# Patient Record
Sex: MALE | URBAN - METROPOLITAN AREA
[De-identification: names, ages, dates, MRNs, and addresses within clinical notes are randomized per-mention and may not be internally consistent; named-entity substitution may affect disease eponyms.]

---

## 2019-04-15 DIAGNOSIS — Z96.641 PRESENCE OF RIGHT ARTIFICIAL HIP JOINT: Primary | ICD-10-CM

## 2019-04-22 ENCOUNTER — CLINICAL SUPPORT (OUTPATIENT)
Dept: REHABILITATION | Facility: HOSPITAL | Age: 64
End: 2019-04-22
Payer: MEDICARE

## 2019-04-22 DIAGNOSIS — Z74.09 IMPAIRED FUNCTIONAL MOBILITY, BALANCE, GAIT, AND ENDURANCE: ICD-10-CM

## 2019-04-22 DIAGNOSIS — R29.898 WEAKNESS OF RIGHT HIP: ICD-10-CM

## 2019-04-22 DIAGNOSIS — M25.651 DECREASED RANGE OF RIGHT HIP MOVEMENT: ICD-10-CM

## 2019-04-22 PROCEDURE — 97161 PT EVAL LOW COMPLEX 20 MIN: CPT | Mod: PO | Performed by: PHYSICAL THERAPIST

## 2019-04-22 PROCEDURE — G8979 MOBILITY GOAL STATUS: HCPCS | Mod: CK,PO | Performed by: PHYSICAL THERAPIST

## 2019-04-22 PROCEDURE — G8978 MOBILITY CURRENT STATUS: HCPCS | Mod: CK,PO | Performed by: PHYSICAL THERAPIST

## 2019-04-22 NOTE — PROGRESS NOTES
[facility]  Physical Therapy Initial Evaluation    Name: Ren Edge  Clinic Number: [id number]    Therapy Diagnosis:   Encounter Diagnoses   Name Primary?    Weakness of right hip     Impaired functional mobility, balance, gait, and endurance     Decreased range of right hip movement      Physician: Keith Parra, MARCIAL    Physician Orders: PT Eval and Treat left hip  Medical Diagnosis from Referral: osteoarthritis right hip; status post hip replacement   Evaluation Date: 4/22/2019  Authorization Period Expiration: 4/14/2020  Plan of Care Expiration: 7/22/2019  Visit # / Visits authorized: 1/ 1    Time In: 1520  Time Out: 1620  Total Billable Time: 60 minutes    Precautions: blood thinners and diuretics    Subjective      Medical History:   No past medical history on file.    Surgical History:   Ren Edge  has no past surgical history on file.    Medications:   Ren currently has no medications in their medication list.    Allergies:   Review of patient's allergies indicates:  Allergies not on file        History of current condition - Ren reports: the hip is feeling fine. He is not reporting any pain but feels there is weakness in the right leg. He feels his balance is a problem .  Date of onset: surgery - right THR 3/18/2019. Did not undergo  Physical Therapy. He started to drive 4 days after the surgery. Developed a fluid retention problem which created blistering in the legs with oozing. Says he experienced challenges with trying to schedule therapy. Presently the blistering has improved with very minimal fluid oozing.   He also is  Relating to challenges with both knees and is being told he is in need of TKR.     Pain:  Current 0/10, worst 0/10, best 0/10   Location: right hip   Description: NA  Aggravating Factors: nothing at this time   Easing Factors: reclined on the sofa   Current Level of Function:   Personal - no limitation bathing / dressing    Domestic - climbing, but he says he is not significantly restricted.   Community / social - depends on the terrain and for how long he is walking; grass cutting.   Occupation - NA   Recreation / fitness - Does not participate, limited by previous hip problems   Prior Therapy: no   Social History:   lives with their spouse in a single family   Occupation: currently not working.   Prior Level of Function: less functional prior to surgery.   Pts goals: get back to work where he can tolerate walking, climbing etc.   Imaging, none:             Objective     Posture: slight shift to the left  Palpation: unremarkable for elicited pain in the right hip, gluteal area   Sensation: intact     Range of Motion/Strength:       Hip  Right  Pain/Dysfunction with Movement    AROM PROM MMT    Flexion 90 110 3-/5    Extension 10 20 2+/5    Abduction 30 sup 40 sup 2-/5    Internal rotation 20 sit  25 pro 25 sit  35 pro NA  NA    External rotation 20 sit  45 pro   40 sit  50 pro NA  NA           Gait: With AD.  Device Used -  Cane  Analysis: demonstrates a flexed right knee position at mid stance and during heel strike   Bed Mobility:Independent  Transfers: Independent      CMS Impairment/Limitation/Restriction for FOTO hip Survey    Therapist reviewed FOTO scores for Ren Edge on 4/22/2019.   FOTO documents entered into [employer] - see Media section.    Limitation Score: 54%  Category: Mobility    Current : CK = at least 40% but < 60% impaired, limited or restricted  Goal: CK = at least 40% but < 60% impaired, limited or restricted  Discharge: CJ = at least 20% but < 40% impaired, limited or restricted           Assessment     Ren is a 63 y.o. male referred to outpatient Physical Therapy with a medical diagnosis of osteoarthritis right hip; status post hip replacement . Pt presents with clinical findings of decreased active hip mobility due to weakness. Hip joint motion, passively, is satisfactory and not restricted.   There is a Knee extension  joint mobility and / or tissue extensibility dysfunction. Standing balance is challenged due to limitations at the knee joint and RLE weakness.      Pt prognosis is Good.   Pt will benefit from skilled outpatient Physical Therapy to address the deficits stated above and in the chart below, provide pt/family education, and to maximize pt's level of independence.     Plan of care discussed with patient: Yes  Pt's spiritual, cultural and educational needs considered and patient is agreeable to the plan of care and goals as stated below:     Anticipated Barriers for therapy: none    Medical Necessity is demonstrated by the following  History  Co-morbidities and personal factors that may impact the plan of care Co-morbidities:   fluid retention, diuretics and blood thinners.    Personal Factors:   no deficits     low   Examination  Body Structures and Functions, activity limitations and participation restrictions that may impact the plan of care Body Regions:   lower extremities  right hip    Body Systems:    musculoskeletal    Participation Restrictions:   None     Activity limitations:   Learning and applying knowledge  no deficits    General Tasks and Commands  no deficits    Communication  no deficits    Mobility  lifting and carrying objects  walking  driving (bike, car, motorcycle)    Self care  no deficits    Domestic Life  doing house work (cleaning house, washing dishes, laundry)  climbing     Interactions/Relationships  no deficits    Life Areas  no deficits    Community and Social Life  community life  due to walking distance and terrain concerns         low   Clinical Presentation stable and uncomplicated low   Decision Making/ Complexity Score: low       Short Term Goals (6 Weeks):      1.Pt to increase strength by a 1/2 grade of muscles test to allow for improvement in functional activities such as performing chores.  2.Pt to improve range of motion by 25% to allow for improved  functional mobility to allow for improvement in IA DLS.   3.Pt to report compliance with HEP and demonstrate proper exercise technique to PT to show competence with self management of condition.  4.Decrease pain by 25% during functional activities.     Long Term Goals (12 Weeks):      1. Increase ROM to allow improved joint biomechanics during functional activities.   2.Increase trunk and lower extremity strength to within normal limits during functional activities.   3. Independent with home exercise program.   4. Full return to functional activities with manageable complaints.  5. Patient to demonstrate improved posture and body mechanics.  6. Decrease pain by 75% during functional activities.          Plan     Plan of care Certification: 4/22/2019 to 7/22/2019.    Outpatient Physical Therapy 2 times weekly for 10 weeks to include the following interventions: Manual Therapy, Neuromuscular Re-ed, Patient Education and Therapeutic Exercise.     Pierre Lala, PT

## 2019-04-30 ENCOUNTER — CLINICAL SUPPORT (OUTPATIENT)
Dept: REHABILITATION | Facility: HOSPITAL | Age: 64
End: 2019-04-30
Payer: MEDICARE

## 2019-04-30 DIAGNOSIS — M25.651 DECREASED RANGE OF RIGHT HIP MOVEMENT: ICD-10-CM

## 2019-04-30 DIAGNOSIS — Z74.09 IMPAIRED FUNCTIONAL MOBILITY, BALANCE, GAIT, AND ENDURANCE: ICD-10-CM

## 2019-04-30 DIAGNOSIS — R29.898 WEAKNESS OF RIGHT HIP: ICD-10-CM

## 2019-04-30 PROCEDURE — 97110 THERAPEUTIC EXERCISES: CPT | Mod: PO

## 2019-04-30 NOTE — PROGRESS NOTES
"  Physical Therapy Daily Treatment Note     Name: Ren Edge  Clinic Number: [id number]    Therapy Diagnosis:   Encounter Diagnoses   Name Primary?    Weakness of right hip     Impaired functional mobility, balance, gait, and endurance     Decreased range of right hip movement      Physician: Keith Parra PA-C    Visit Date: 4/30/2019  Physician Orders: PT Eval and Treat left hip  Medical Diagnosis from Referral: osteoarthritis right hip; status post hip replacement   Evaluation Date: 4/22/2019  Authorization Period Expiration: 4/14/2020  Plan of Care Expiration: 7/22/2019  Visit # / Visits authorized: 2/ 12    Time In: 3:05PM  Time Out: 3:55PM  Total Billable Time: 50 minutes    Precautions: blood thinners and diuretics    Subjective     Pt reports: that his hip is not doing too bad bit c/o some chronic (R) knee pain and stiffness stating that he was told that he needs a knee replacement. He also c/o chronic back pain which he feels is better since his hip surgery.   He was compliant with home exercise program.  Response to previous treatment: Min residual soreness  Functional change: None     Pain: 3/10  Location: right hip and knee      Objective   Patient to clinic ambulating Modified independently with a SC on (L)    Ren received therapeutic exercises to develop strength, endurance, ROM and flexibility for 50 minutes including:     Upright stationary bike x 5 minutes level 1, seat ht = 12     Supine ex's  Heel slides 2 x 10  Hooklying clam with GTB 2 x 10  Hooklying hip add isometric x 20  Bridging 2 x 10    Seated ex's   Repeated sit<->stand from mat @22" height without UE assist 2 x 10 trials     Standing ex's   heel raises x 20  Marching in place x 15 each  HIp abduction x 15 each  Hip extension ( limited ROM) x 15 each     Home Exercises Provided and Patient Education Provided     Education provided:   - cues with ex's   -updated HEP    Written Home Exercises Provided: yes.  Exercises were " reviewed and Ren was able to demonstrate them prior to the end of the session.  Ren demonstrated good  understanding of the education provided.     See EMR under Patient Instructions for exercises provided 4/30/2019.    Assessment     Patient tolerated treatment fairly well. He was able to perform the above ex's/activities within tolerable level of muscular fatigue and discomfort. His arthritic knees are more bothersome than his post-op hip currently. Some generalized weakness and deconditioning is evident requiring intermittent rest breaks due to fatigue. .   Ren is progressing fairly well towards his goals.   Pt prognosis is Good.     Pt will continue to benefit from skilled outpatient physical therapy to address the deficits listed in the problem list box on initial evaluation, provide pt/family education and to maximize pt's level of independence in the home and community environment.     Pt's spiritual, cultural and educational needs considered and pt agreeable to plan of care and goals.     Anticipated barriers to physical therapy: none    Goals: from eval  Short Term Goals (6 Weeks):      1.Pt to increase strength by a 1/2 grade of muscles test to allow for improvement in functional activities such as performing chores. ( progressing)   2.Pt to improve range of motion by 25% to allow for improved functional mobility to allow for improvement in IA DLS. (progressing)  3.Pt to report compliance with HEP and demonstrate proper exercise technique to PT to show competence with self management of condition.(progressing)   4.Decrease pain by 25% during functional activities.(progressing)     Long Term Goals (12 Weeks):      1. Increase ROM to allow improved joint biomechanics during functional activities.   2.Increase trunk and lower extremity strength to within normal limits during functional activities.   3. Independent with home exercise program.   4. Full return to functional activities with manageable  complaints.  5. Patient to demonstrate improved posture and body mechanics.  6. Decrease pain by 75% during functional activities.       Plan     Continue PT towards established plan of care and goals with focus on strength, flexibility, endurance    Jessu Laguerre, PTA

## 2019-05-07 ENCOUNTER — CLINICAL SUPPORT (OUTPATIENT)
Dept: REHABILITATION | Facility: HOSPITAL | Age: 64
End: 2019-05-07
Payer: MEDICARE

## 2019-05-07 DIAGNOSIS — Z74.09 IMPAIRED FUNCTIONAL MOBILITY, BALANCE, GAIT, AND ENDURANCE: ICD-10-CM

## 2019-05-07 DIAGNOSIS — M25.651 DECREASED RANGE OF RIGHT HIP MOVEMENT: Primary | ICD-10-CM

## 2019-05-07 DIAGNOSIS — R29.898 WEAKNESS OF RIGHT HIP: ICD-10-CM

## 2019-05-07 PROCEDURE — 97110 THERAPEUTIC EXERCISES: CPT | Mod: PO | Performed by: PHYSICAL THERAPIST

## 2019-05-07 NOTE — PROGRESS NOTES
"  Physical Therapy Daily Treatment Note     Name: Ren Edge  Clinic Number: [id number]    Therapy Diagnosis:   Encounter Diagnoses   Name Primary?    Decreased range of right hip movement Yes    Impaired functional mobility, balance, gait, and endurance     Weakness of right hip      Physician: Keith Parra PA-C    Visit Date: 5/7/2019  Physician Orders: PT Eval and Treat left hip  Medical Diagnosis from Referral: osteoarthritis right hip; status post hip replacement   Evaluation Date: 4/22/2019  Authorization Period Expiration: 4/14/2020  Plan of Care Expiration: 7/22/2019  Visit # / Visits authorized: 2/ 12    Time In: 3:05 PM  Time Out: 4:10 PM  Total Billable Time: 65 minutes    Precautions: blood thinners and diuretics    Subjective     Pt reports: the knees have been hurting all week. The hip is all right. Certain movements seem to hurt. There is no pain in the hip today.      He was compliant with home exercise program.  Response to previous treatment: Min residual soreness  Functional change: None     Pain: 0/10 hip and 6/10 left knee   Location: right hip and knee      Objective   Patient to clinic ambulating Modified independently with a SC on (L)    Ren received therapeutic exercises to develop strength, endurance, ROM and flexibility for ** minutes including:     Upright stationary bike x 5 minutes level 1, seat ht = 12     Supine ex's  Assisted knee to chest w/CLX x20   Posterior hip stretch SKC 5" x 15   SLR  Assisted w/CLX x 15   Leonidas hip IR w/ball squeeze / hip ER against theraband hold 7" x 15   Heel slides 2 x 10    SIDELYING  Clam  2 x 10  Reverse clam  x 20   2 x 10    Seated ex's   Repeated sit<->stand from mat @22" height without UE assist 2 x 10 trials     Standing ex's  Heel raises x 20  Marching in place x 15 each  HIp abduction x 15 each  Hip extension ( limited ROM) x 15 each     Home Exercises Provided and Patient Education Provided     Education provided:   - cues with " ex's   -updated HEP    Written Home Exercises Provided: yes.  Exercises were reviewed and Ren was able to demonstrate them prior to the end of the session.  Ren demonstrated good  understanding of the education provided.     See EMR under Patient Instructions for exercises provided 4/30/2019.    Assessment     The patient demonstrates mobility dysfunction at the right hip. He is able to complete the activities but is challenged due to muscle tightness, weakness and decreased endurance. Balance and stability are noted compromised as relates to the RLE .   Ren is progressing fairly well towards his goals.   Pt prognosis is Good.   Pt will continue to benefit from skilled outpatient physical therapy to address the deficits listed in the problem list box on initial evaluation, provide pt/family education and to maximize pt's level of independence in the home and community environment.   Pt's spiritual, cultural and educational needs considered and pt agreeable to plan of care and goals.     Anticipated barriers to physical therapy: none    Goals: from eval  Short Term Goals (6 Weeks):      1.Pt to increase strength by a 1/2 grade of muscles test to allow for improvement in functional activities such as performing chores. ( progressing)   2.Pt to improve range of motion by 25% to allow for improved functional mobility to allow for improvement in IA DLS. (progressing)  3.Pt to report compliance with HEP and demonstrate proper exercise technique to PT to show competence with self management of condition.(progressing)   4.Decrease pain by 25% during functional activities.(progressing)     Long Term Goals (12 Weeks):      1. Increase ROM to allow improved joint biomechanics during functional activities.   2.Increase trunk and lower extremity strength to within normal limits during functional activities.   3. Independent with home exercise program.   4. Full return to functional activities with manageable  complaints.  5. Patient to demonstrate improved posture and body mechanics.  6. Decrease pain by 75% during functional activities.       Plan     Continue PT towards established plan of care and goals with focus on strength, flexibility, endurance    Pierre Lala, PT

## 2019-05-13 ENCOUNTER — CLINICAL SUPPORT (OUTPATIENT)
Dept: REHABILITATION | Facility: HOSPITAL | Age: 64
End: 2019-05-13
Payer: MEDICARE

## 2019-05-13 DIAGNOSIS — R29.898 WEAKNESS OF RIGHT HIP: ICD-10-CM

## 2019-05-13 DIAGNOSIS — Z74.09 IMPAIRED FUNCTIONAL MOBILITY, BALANCE, GAIT, AND ENDURANCE: ICD-10-CM

## 2019-05-13 DIAGNOSIS — M25.651 DECREASED RANGE OF RIGHT HIP MOVEMENT: Primary | ICD-10-CM

## 2019-05-13 PROCEDURE — 97110 THERAPEUTIC EXERCISES: CPT | Mod: PO | Performed by: PHYSICAL THERAPIST

## 2019-05-13 NOTE — PROGRESS NOTES
"  Physical Therapy Daily Treatment Note     Name: Ren Edge  Clinic Number: [id number]    Therapy Diagnosis:   Encounter Diagnoses   Name Primary?    Decreased range of right hip movement Yes    Impaired functional mobility, balance, gait, and endurance     Weakness of right hip      Physician: Keith Parra PA-C    Visit Date: 5/13/2019  Physician Orders: PT Eval and Treat left hip  Medical Diagnosis from Referral: osteoarthritis right hip; status post hip replacement   Evaluation Date: 4/22/2019  Authorization Period Expiration: 4/14/2020  Plan of Care Expiration: 7/22/2019  Visit # / Visits authorized: 2/ 12    Time In:1115   Time Out:   Total Billable Time:  minutes    Precautions: blood thinners and diuretics    Subjective     Pt reports: there is no hip pain but the left knee is painful today.    He was compliant with home exercise program.  Response to previous treatment: Min residual soreness  Functional change: None     Pain: 0/10 hip and 3/10 left knee   Location: right hip and left knee      Objective   Patient to clinic ambulating Modified independently with a SC on (L)    Ren received therapeutic exercises to develop strength, endurance, ROM and flexibility for ** minutes including:     Upright stationary bike x 5 minutes level 1, seat ht = 12     Supine ex's  Assisted knee to chest w/CLX x20   Posterior hip stretch SKC 5" x 15   SLR  Assisted w/CLX x 15 -  5" hold   SLR 2x10   Leonidas hip IR w/ball squeeze / hip ER against theraband hold 7" x 15   Heel slides 2 x 10   w/CLX    SIDELYING  Clam  2 x 10  Reverse clam  x 20 1#      Seated ex's   Repeated sit<->stand from mat @22" height without UE assist 2 x 10 trials     Standing ex's  Heel raises x 20  Single leg hi-knee  x 20 each  HIp abduction x 20 each  Hip extension ( limited ROM) x 20 each  Squats 2x10      Home Exercises Provided and Patient Education Provided     Education provided:   -cues with ex's   -updated HEP    Written Home " Exercises Provided: yes.  Exercises were reviewed and Ren was able to demonstrate them prior to the end of the session.  Ren demonstrated good  understanding of the education provided.     See EMR under Patient Instructions for exercises provided 4/30/2019.    Assessment     Activities performed and completed as noted. Endurance is compromised. He has shown some improvement in mobility. Will have to progress with CC activity, balance and stability challenges. Pain is not demonstrated by limitation when performing the activities.   Ren is progressing fairly well towards his goals.   Pt prognosis is Good.   Pt will continue to benefit from skilled outpatient physical therapy to address the deficits listed in the problem list box on initial evaluation, provide pt/family education and to maximize pt's level of independence in the home and community environment.   Pt's spiritual, cultural and educational needs considered and pt agreeable to plan of care and goals.     Anticipated barriers to physical therapy: none    Goals: from eval  Short Term Goals (6 Weeks):      1.Pt to increase strength by a 1/2 grade of muscles test to allow for improvement in functional activities such as performing chores. ( progressing)   2.Pt to improve range of motion by 25% to allow for improved functional mobility to allow for improvement in ADLS. (progressing)  3.Pt to report compliance with HEP and demonstrate proper exercise technique to PT to show competence with self management of condition.(progressing)   4.Decrease pain by 25% during functional activities.(progressing)     Long Term Goals (12 Weeks):      1. Increase ROM to allow improved joint biomechanics during functional activities.   2.Increase trunk and lower extremity strength to within normal limits during functional activities.   3. Independent with home exercise program.   4. Full return to functional activities with manageable complaints.  5. Patient to  demonstrate improved posture and body mechanics.  6. Decrease pain by 75% during functional activities.       Plan     Continue PT towards established plan of care and goals with focus on strength, flexibility, endurance    Pierre Lala, PT

## 2019-05-16 ENCOUNTER — CLINICAL SUPPORT (OUTPATIENT)
Dept: REHABILITATION | Facility: HOSPITAL | Age: 64
End: 2019-05-16
Payer: MEDICARE

## 2019-05-16 DIAGNOSIS — R29.898 WEAKNESS OF RIGHT HIP: ICD-10-CM

## 2019-05-16 DIAGNOSIS — Z74.09 IMPAIRED FUNCTIONAL MOBILITY, BALANCE, GAIT, AND ENDURANCE: ICD-10-CM

## 2019-05-16 DIAGNOSIS — M25.651 DECREASED RANGE OF RIGHT HIP MOVEMENT: Primary | ICD-10-CM

## 2019-05-16 PROCEDURE — 97110 THERAPEUTIC EXERCISES: CPT | Mod: PO | Performed by: PHYSICAL THERAPIST

## 2019-05-16 NOTE — PROGRESS NOTES
"  Physical Therapy Daily Treatment Note     Name: Ren dEge  Clinic Number: [id number]    Therapy Diagnosis:   Encounter Diagnoses   Name Primary?    Decreased range of right hip movement Yes    Impaired functional mobility, balance, gait, and endurance     Weakness of right hip      Physician: Keith Parra PA-C    Visit Date: 5/16/2019  Physician Orders: PT Eval and Treat left hip  Medical Diagnosis from Referral: osteoarthritis right hip; status post hip replacement   Evaluation Date: 4/22/2019  Authorization Period Expiration: 4/14/2020  Plan of Care Expiration: 7/22/2019  Visit # / Visits authorized: 5 / 12    Time In: 1320  Time Out: 1430  Total Billable Time: 70  minutes    Precautions: blood thinners and diuretics    Subjective     Pt reports: the hip is doing fine but the left knee is a problem.    He was compliant with home exercise program.  Response to previous treatment: Min residual soreness  Functional change: None     Pain: 0/10 hip and 3/10 left knee   Location: right hip and left knee      Objective          Hip   Right   Pain/Dysfunction with Movement     AROM PROM MMT     Flexion 90       95 110    110 3-/5     Extension 10 20 2+/5     Abduction 30 sup 40 40 sup 50 2-/5     Internal rotation 20 sit   25  25 pro  30 25 sit   35  35 pro 40 NA  NA     External rotation 20 sit   25  45 pro  50    40 sit   45  50 pro 60 NA  NA         Patient to clinic ambulating Modified independently with a SC on (L)    Ren received therapeutic exercises to develop strength, endurance, ROM and flexibility for 30   minutes including:     Upright stationary bike x 5 minutes level 1, seat ht = 12     Supine ex's  Assisted knee to chest w/CLX x20   Posterior hip stretch SKC 5" x 15   SLR  Assisted w/CLX x 15 -  5" hold   SLR 2x10   Leonidas hip IR w/ball squeeze / hip ER against theraband hold 7" x 15   Heel slides 2 x 10   w/CLX    SIDELYING  Clam  2 x 10  Reverse clam  x 20 1#      Seated ex's   Repeated " "sit<->stand from mat @22" height without UE assist 2 x 10 trials     Standing ex's  Heel raises x 20  Single leg hi-knee  x 20 each  HIp abduction x 20 each  Hip extension ( limited ROM) x 20 each  Squats 2x10      Home Exercises Provided and Patient Education Provided     Education provided:   -cues with ex's   -updated HEP    Written Home Exercises Provided: yes.  Exercises were reviewed and Ren was able to demonstrate them prior to the end of the session.  Ren demonstrated good  understanding of the education provided.     See EMR under Patient Instructions for exercises provided 4/30/2019.    Assessment     Completed activities as noted. There is slight improvement in hip mobility. Endurance is compromised. He maintains significant flexibility challenges. Continuing with stretching and progress with CC activities.   Ren is progressing fairly well towards his goals.   Pt prognosis is Good.   Pt will continue to benefit from skilled outpatient physical therapy to address the deficits listed in the problem list box on initial evaluation, provide pt/family education and to maximize pt's level of independence in the home and community environment.   Pt's spiritual, cultural and educational needs considered and pt agreeable to plan of care and goals.     Anticipated barriers to physical therapy: none    Goals: from eval  Short Term Goals (6 Weeks):      1.Pt to increase strength by a 1/2 grade of muscles test to allow for improvement in functional activities such as performing chores. ( progressing)   2.Pt to improve range of motion by 25% to allow for improved functional mobility to allow for improvement in ADLS. (progressing)  3.Pt to report compliance with HEP and demonstrate proper exercise technique to PT to show competence with self management of condition.(progressing)   4.Decrease pain by 25% during functional activities.(progressing)     Long Term Goals (12 Weeks):      1. Increase ROM to allow " improved joint biomechanics during functional activities.   2.Increase trunk and lower extremity strength to within normal limits during functional activities.   3. Independent with home exercise program.   4. Full return to functional activities with manageable complaints.  5. Patient to demonstrate improved posture and body mechanics.  6. Decrease pain by 75% during functional activities.       Plan     Continue PT towards established plan of care and goals with focus on strength, flexibility, endurance    Pierre Lala, PT

## 2019-05-21 ENCOUNTER — CLINICAL SUPPORT (OUTPATIENT)
Dept: REHABILITATION | Facility: HOSPITAL | Age: 64
End: 2019-05-21
Payer: MEDICARE

## 2019-05-21 DIAGNOSIS — R29.898 WEAKNESS OF RIGHT HIP: ICD-10-CM

## 2019-05-21 DIAGNOSIS — M25.651 DECREASED RANGE OF RIGHT HIP MOVEMENT: Primary | ICD-10-CM

## 2019-05-21 DIAGNOSIS — Z74.09 IMPAIRED FUNCTIONAL MOBILITY, BALANCE, GAIT, AND ENDURANCE: ICD-10-CM

## 2019-05-21 PROCEDURE — 97110 THERAPEUTIC EXERCISES: CPT | Mod: PO | Performed by: PHYSICAL THERAPIST

## 2019-05-21 NOTE — PROGRESS NOTES
"  Physical Therapy Daily Treatment Note     Name: Ren Edge  Clinic Number: [id number]    Therapy Diagnosis:   Encounter Diagnoses   Name Primary?    Decreased range of right hip movement Yes    Impaired functional mobility, balance, gait, and endurance     Weakness of right hip      Physician: Keith Parra PA-C    Visit Date: 5/21/2019  Physician Orders: PT Eval and Treat left hip  Medical Diagnosis from Referral: osteoarthritis right hip; status post hip replacement   Evaluation Date: 4/22/2019  Authorization Period Expiration: 4/14/2020  Plan of Care Expiration: 7/22/2019  Visit # / Visits authorized: 6 / 12    Time In: 1010  Time Out: 1115  Total Billable Time: 65  minutes    Precautions: blood thinners and diuretics    Subjective     Pt reports: the hip is fine and he is not having any pain. He says that he is still having the most problem with the left knee.      He was compliant with home exercise program.  Response to previous treatment: Min residual soreness  Functional change: None     Pain: 0/10 hip and 3/10 left knee   Location: right hip and left knee      Objective          Hip   Right   Pain/Dysfunction with Movement     AROM PROM MMT     Flexion 90       95 110    110 3-/5     Extension 10 20 2+/5     Abduction 30 sup 40 40 sup 50 2-/5     Internal rotation 20 sit   25  25 pro  30 25 sit   35  35 pro 40 NA  NA     External rotation 20 sit   25  45 pro  50    40 sit   45  50 pro 60 NA  NA         Patient to clinic ambulating Modified independently with a SC on (L)    Ren received therapeutic exercises to develop strength, endurance, ROM and flexibility for 30   minutes including:     Upright stationary bike x 5 minutes level 1, seat ht = 12     Supine ex's  Knee to chest x20   Posterior hip stretch SKC 5" x 15   SLR  Assisted w/CLX x 15 -  5" hold   SLR 2x10   Leonidas hip IR w/ball squeeze / hip ER against theraband hold 7" x 15   Heel slides 2 x 10   w/CLX    SIDELYING  Clam  2 x " "10  Reverse clam  x 20 1#      Seated ex's   Repeated sit<->stand from mat @22" height without UE assist 2 x 10 trials     Standing ex's  Heel raises x 20  Single leg hi-knee  x 20 each  HIp abduction x 20 each  Hip extension ( limited ROM) x 20 each  Squats 2x10      Home Exercises Provided and Patient Education Provided     Education provided:   -cues with ex's   -updated HEP    Written Home Exercises Provided: yes.  Exercises were reviewed and Ren was able to demonstrate them prior to the end of the session.  Ren demonstrated good  understanding of the education provided.     See EMR under Patient Instructions for exercises provided 4/30/2019.    Assessment     Performed all exercises w/o difficulty. His diminished flexibility creates some challenges however it does not create pain. Will progress with CC activity as side steps lunges and marching.       Ren is progressing fairly well towards his goals.   Pt prognosis is Good.   Pt will continue to benefit from skilled outpatient physical therapy to address the deficits listed in the problem list box on initial evaluation, provide pt/family education and to maximize pt's level of independence in the home and community environment.   Pt's spiritual, cultural and educational needs considered and pt agreeable to plan of care and goals.     Anticipated barriers to physical therapy: none    Goals: from eval  Short Term Goals (6 Weeks):      1.Pt to increase strength by a 1/2 grade of muscles test to allow for improvement in functional activities such as performing chores. ( progressing)   2.Pt to improve range of motion by 25% to allow for improved functional mobility to allow for improvement in ADLS. (progressing)  3.Pt to report compliance with HEP and demonstrate proper exercise technique to PT to show competence with self management of condition.(progressing)   4.Decrease pain by 25% during functional activities.(progressing)     Long Term Goals (12 " Weeks):      1. Increase ROM to allow improved joint biomechanics during functional activities.   2.Increase trunk and lower extremity strength to within normal limits during functional activities.   3. Independent with home exercise program.   4. Full return to functional activities with manageable complaints.  5. Patient to demonstrate improved posture and body mechanics.  6. Decrease pain by 75% during functional activities.       Plan     Continue PT towards established plan of care and goals with focus on strength, flexibility, endurance    Pierre Lala, PT

## 2019-05-23 ENCOUNTER — CLINICAL SUPPORT (OUTPATIENT)
Dept: REHABILITATION | Facility: HOSPITAL | Age: 64
End: 2019-05-23
Payer: MEDICARE

## 2019-05-23 DIAGNOSIS — Z74.09 IMPAIRED FUNCTIONAL MOBILITY, BALANCE, GAIT, AND ENDURANCE: ICD-10-CM

## 2019-05-23 DIAGNOSIS — M25.651 DECREASED RANGE OF RIGHT HIP MOVEMENT: Primary | ICD-10-CM

## 2019-05-23 DIAGNOSIS — R29.898 WEAKNESS OF RIGHT HIP: ICD-10-CM

## 2019-05-23 PROCEDURE — 97110 THERAPEUTIC EXERCISES: CPT | Mod: PO | Performed by: PHYSICAL THERAPIST

## 2019-05-23 NOTE — PROGRESS NOTES
"  Physical Therapy Daily Treatment Note     Name: Ren Edge  Clinic Number: [id number]    Therapy Diagnosis:   Encounter Diagnoses   Name Primary?    Decreased range of right hip movement Yes    Impaired functional mobility, balance, gait, and endurance     Weakness of right hip      Physician: Keith Parra PA-C    Visit Date: 5/23/2019  Physician Orders: PT Eval and Treat left hip  Medical Diagnosis from Referral: osteoarthritis right hip; status post hip replacement   Evaluation Date: 4/22/2019  Authorization Period Expiration: 4/14/2020  Plan of Care Expiration: 7/22/2019  Visit # / Visits authorized: 7 / 12    Time In: 1420  Time Out: 1520  Total Billable Time: 60  minutes    Precautions: blood thinners and diuretics    Subjective     Pt reports: there is no pain in the hip but the knee is the problem as far as pain.    He was compliant with home exercise program.  Response to previous treatment: Min residual soreness  Functional change: None     Pain: 0/10 hip and 3/10 left knee   Location: right hip and left knee      Objective          Hip   Right   Pain/Dysfunction with Movement     AROM PROM MMT     Flexion 90       95 110    110 3-/5     Extension 10 20 2+/5     Abduction 30 sup 40 40 sup 50 2-/5     Internal rotation 20 sit   25  25 pro  30 25 sit   35  35 pro 40 NA  NA     External rotation 20 sit   25  45 pro  50    40 sit   45  50 pro 60 NA  NA         Patient to clinic ambulating Modified independently with a SC on (L)    Ren received therapeutic exercises to develop strength, endurance, ROM and flexibility for 30   minutes including:     Upright stationary bike x 7 minutes level 1, seat ht = 9 to facilitate right hip mobility and left knee ROM.     Supine ex's  Knee to chest x20   Posterior hip stretch SKC 5" x 15 Mulligan Belt   SLR  Assisted w/CLX x 15 -  5" hold   SLR 2x10   Leonidas hip IR w/ball squeeze / hip ER against theraband hold 7" x 15   Heel slides 2 x 10   " "w/CLX    SIDELYING  Clam  2 x 10  Reverse clam  x 20 1#      Seated ex's   Repeated sit<->stand from mat @22" height without UE assist 2 x 10 trials     Standing ex's  Heel raises x 20  Single leg hi-knee  x 20 each  HIp abduction x 20 each  Hip extension ( limited ROM) x 20 each  Squats 2x10   Hip rotation x20 ea.    Home Exercises Provided and Patient Education Provided     Education provided:   -cues with ex's   -updated HEP  ...  CMS Impairment/Limitation/Restriction for FOTO hip Survey    Therapist reviewed FOTO scores for Ren Edge on 5/23/2019.   FOTO documents entered into [employer] - see Media section.    Limitation Score: 54%  Category: Mobility    Current : CK = at least 40% but < 60% impaired, limited or restricted  Goal: CK = at least 40% but < 60% impaired, limited or restricted  Discharge: CK = at least 40% but < 60% impaired, limited or restricted         Written Home Exercises Provided: yes.  Exercises were reviewed and Ren was able to demonstrate them prior to the end of the session.  Ren demonstrated good  understanding of the education provided.     See EMR under Patient Instructions for exercises provided 4/30/2019.    Assessment     The patient completed his routine without much difficulty. His endurance is compromised. Does not encounter pain during the activities. Hip flexion is limited and there is end range tightness. Progress with CC events.     Ren is progressing fairly well towards his goals.   Pt prognosis is Good.   Pt will continue to benefit from skilled outpatient physical therapy to address the deficits listed in the problem list box on initial evaluation, provide pt/family education and to maximize pt's level of independence in the home and community environment.   Pt's spiritual, cultural and educational needs considered and pt agreeable to plan of care and goals.     Anticipated barriers to physical therapy: none    Goals: from eval  Short Term Goals (6 Weeks): "      1.Pt to increase strength by a 1/2 grade of muscles test to allow for improvement in functional activities such as performing chores. ( progressing)   2.Pt to improve range of motion by 25% to allow for improved functional mobility to allow for improvement in ADLS. (progressing)  3.Pt to report compliance with HEP and demonstrate proper exercise technique to PT to show competence with self management of condition.(progressing)   4.Decrease pain by 25% during functional activities.(progressing)     Long Term Goals (12 Weeks):      1. Increase ROM to allow improved joint biomechanics during functional activities.   2.Increase trunk and lower extremity strength to within normal limits during functional activities.   3. Independent with home exercise program.   4. Full return to functional activities with manageable complaints.  5. Patient to demonstrate improved posture and body mechanics.  6. Decrease pain by 75% during functional activities.       Plan     Continue PT towards established plan of care and goals with focus on strength, flexibility, endurance    Pierre Lala, PT

## 2019-05-27 ENCOUNTER — CLINICAL SUPPORT (OUTPATIENT)
Dept: REHABILITATION | Facility: HOSPITAL | Age: 64
End: 2019-05-27
Attending: PHYSICIAN ASSISTANT
Payer: MEDICARE

## 2019-05-27 DIAGNOSIS — M25.651 DECREASED RANGE OF RIGHT HIP MOVEMENT: Primary | ICD-10-CM

## 2019-05-27 DIAGNOSIS — R29.898 WEAKNESS OF RIGHT HIP: ICD-10-CM

## 2019-05-27 DIAGNOSIS — Z74.09 IMPAIRED FUNCTIONAL MOBILITY, BALANCE, GAIT, AND ENDURANCE: ICD-10-CM

## 2019-05-27 PROCEDURE — 97110 THERAPEUTIC EXERCISES: CPT | Mod: PO | Performed by: PHYSICAL THERAPIST

## 2019-05-27 NOTE — PROGRESS NOTES
"  Physical Therapy Daily Treatment Note     Name: Ren Edge  Clinic Number: [id number]    Therapy Diagnosis:   Encounter Diagnoses   Name Primary?    Decreased range of right hip movement Yes    Impaired functional mobility, balance, gait, and endurance     Weakness of right hip      Physician: Keith Parra PA-C    Visit Date: 5/27/2019  Physician Orders: PT Eval and Treat left hip  Medical Diagnosis from Referral: osteoarthritis right hip; status post hip replacement   Evaluation Date: 4/22/2019  Authorization Period Expiration: 4/14/2020  Plan of Care Expiration: 7/22/2019  Visit # / Visits authorized: 8 / 12    Time In: 1422  Time Out: 1510  Total Billable Time: 48  minutes    Precautions: blood thinners and diuretics    Subjective     Pt reports: the hip is fine but everything else is a problem. Says the left knee is swollen and the majority of pain is in the left knee.   He was compliant with home exercise program.  Response to previous treatment: did well.   Functional change: None   Pain: 0/10 hip and 3/10 left knee   Location: right hip and left knee      Objective          Hip   Right   Pain/Dysfunction with Movement     AROM PROM MMT     Flexion 90       95 110    110 3-/5     Extension 10 20 2+/5     Abduction 30 sup 40 40 sup 50 2-/5     Internal rotation 20 sit   25  25 pro  30 25 sit   35  35 pro 40 NA  NA     External rotation 20 sit   25  45 pro  50    40 sit   45  50 pro 60 NA  NA         Patient to clinic ambulating Modified independently with a SC on (L)    Ren received therapeutic exercises to develop strength, endurance, ROM and flexibility for 30   minutes including:     Upright stationary bike x 7 minutes level 1, seat ht = 9 to facilitate right hip mobility and left knee ROM.     Supine ex's  Knee to chest x20   Posterior hip stretch SKC 5" x 15 Mulligan Belt   SLR  Assisted w/CLX x 15 -  5" hold   SLR 2x10   Leonidas hip IR w/ball squeeze / hip ER against theraband hold 7" x 15 " "  Heel slides 2 x 10   w/CLX    SIDELYING  Clam  2 x 10  Reverse clam  x 20 1#      Seated ex's   Repeated sit<->stand from mat @22" height without UE assist 2 x 10 trials     Standing ex's  Heel raises x 20  Single leg hi-knee  x 20 each  HIp abduction x 20 each  Hip extension ( limited ROM) x 20 each  Squats 2x10   Hip rotation x20 ea.  + single leg  + tandem stand.     Home Exercises Provided and Patient Education Provided     Education provided:   -cues with ex's   -updated HEP  ...  CMS Impairment/Limitation/Restriction for FOTO hip Survey      5/23/2019 visit #7    Therapist reviewed FOTO scores for Ren Edge on 5/27/2019.   FOTO documents entered into [employer] - see Media section.    Limitation Score: 54%  Category: Mobility    Current : CK = at least 40% but < 60% impaired, limited or restricted  Goal: CK = at least 40% but < 60% impaired, limited or restricted  Discharge: CK = at least 40% but < 60% impaired, limited or restricted         Written Home Exercises Provided: yes.  Exercises were reviewed and Ren was able to demonstrate them prior to the end of the session.  Ren demonstrated good  understanding of the education provided.     See EMR under Patient Instructions for exercises provided 4/30/2019.    Assessment      The patient completed the activities as noted.  Will progress with standing activities to include balance and stability. There are other areas of dysfunction that are inhibitory to his overall mobility patterns.   Ren is progressing fairly well towards his goals.   Pt prognosis is Good.   Pt will continue to benefit from skilled outpatient physical therapy to address the deficits listed in the problem list box on initial evaluation, provide pt/family education and to maximize pt's level of independence in the home and community environment.   Pt's spiritual, cultural and educational needs considered and pt agreeable to plan of care and goals.     Anticipated barriers to " physical therapy: none    Goals: from eval  Short Term Goals (6 Weeks):      1.Pt to increase strength by a 1/2 grade of muscles test to allow for improvement in functional activities such as performing chores. ( progressing)   2.Pt to improve range of motion by 25% to allow for improved functional mobility to allow for improvement in ADLS. (progressing)  3.Pt to report compliance with HEP and demonstrate proper exercise technique to PT to show competence with self management of condition.(progressing)   4.Decrease pain by 25% during functional activities.(progressing)     Long Term Goals (12 Weeks):      1. Increase ROM to allow improved joint biomechanics during functional activities.   2.Increase trunk and lower extremity strength to within normal limits during functional activities.   3. Independent with home exercise program.   4. Full return to functional activities with manageable complaints.  5. Patient to demonstrate improved posture and body mechanics.  6. Decrease pain by 75% during functional activities.       Plan     Continue PT towards established plan of care and goals with focus on strength, flexibility, endurance    Pierre Lala, PT

## 2020-03-24 ENCOUNTER — DOCUMENTATION ONLY (OUTPATIENT)
Dept: REHABILITATION | Facility: HOSPITAL | Age: 65
End: 2020-03-24

## 2020-03-24 NOTE — PROGRESS NOTES
Outpatient Therapy Discharge Summary     Name: Ren Edge  Clinic Number: [id number]  Therapy Diagnosis:        Encounter Diagnoses   Name Primary?    Decreased range of right hip movement Yes    Impaired functional mobility, balance, gait, and endurance      Weakness of right hip        Physician: Keith Parra PA-C     Visit Date: 5/27/2019  Physician Orders: PT Eval and Treat left hip  Medical Diagnosis from Referral: osteoarthritis right hip; status post hip replacement   Evaluation Date: 4/22/2019  Date of Last visit: 5/27/2019  Total Visits Received: 8    Assessment    Goals:   Short Term Goals (6 Weeks):      1.Pt to increase strength by a 1/2 grade of muscles test to allow for improvement in functional activities such as performing chores. ( progressing)   2.Pt to improve range of motion by 25% to allow for improved functional mobility to allow for improvement in ADLS. (progressing)  3.Pt to report compliance with HEP and demonstrate proper exercise technique to PT to show competence with self management of condition.(progressing)   4.Decrease pain by 25% during functional activities.(progressing)     Long Term Goals (12 Weeks):      1. Increase ROM to allow improved joint biomechanics during functional activities.   2.Increase trunk and lower extremity strength to within normal limits during functional activities.   3. Independent with home exercise program.   4. Full return to functional activities with manageable complaints.  5. Patient to demonstrate improved posture and body mechanics.  6. Decrease pain by 75% during functional activities.        Discharge reason: Patient has not attended therapy since 5/27/2019. Developed left knee pain which was giving him the most problem.     Plan   This patient is discharged from Physical Therapy    Pierre Lala PT